# Patient Record
Sex: MALE | Race: WHITE | NOT HISPANIC OR LATINO | URBAN - METROPOLITAN AREA
[De-identification: names, ages, dates, MRNs, and addresses within clinical notes are randomized per-mention and may not be internally consistent; named-entity substitution may affect disease eponyms.]

---

## 2022-12-14 ENCOUNTER — INPATIENT (INPATIENT)
Facility: HOSPITAL | Age: 47
LOS: 5 days | Discharge: HOME | DRG: 920 | End: 2022-12-20
Attending: SURGERY | Admitting: SURGERY
Payer: MEDICARE

## 2022-12-14 VITALS
RESPIRATION RATE: 18 BRPM | WEIGHT: 205.91 LBS | DIASTOLIC BLOOD PRESSURE: 84 MMHG | HEIGHT: 70 IN | SYSTOLIC BLOOD PRESSURE: 125 MMHG | TEMPERATURE: 98 F | HEART RATE: 88 BPM | OXYGEN SATURATION: 98 %

## 2022-12-14 DIAGNOSIS — Z98.890 OTHER SPECIFIED POSTPROCEDURAL STATES: Chronic | ICD-10-CM

## 2022-12-14 DIAGNOSIS — L03.311 CELLULITIS OF ABDOMINAL WALL: ICD-10-CM

## 2022-12-14 DIAGNOSIS — K63.2 FISTULA OF INTESTINE: ICD-10-CM

## 2022-12-14 DIAGNOSIS — K43.9 VENTRAL HERNIA WITHOUT OBSTRUCTION OR GANGRENE: ICD-10-CM

## 2022-12-14 DIAGNOSIS — L98.499 NON-PRESSURE CHRONIC ULCER OF SKIN OF OTHER SITES WITH UNSPECIFIED SEVERITY: ICD-10-CM

## 2022-12-14 DIAGNOSIS — T81.83XA PERSISTENT POSTPROCEDURAL FISTULA, INITIAL ENCOUNTER: ICD-10-CM
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
## 2022-12-14 DIAGNOSIS — F17.200 NICOTINE DEPENDENCE, UNSPECIFIED, UNCOMPLICATED: ICD-10-CM

## 2022-12-14 DIAGNOSIS — Z53.09 PROCEDURE AND TREATMENT NOT CARRIED OUT BECAUSE OF OTHER CONTRAINDICATION: ICD-10-CM

## 2022-12-14 LAB
ALBUMIN SERPL ELPH-MCNC: 3.8 G/DL — SIGNIFICANT CHANGE UP (ref 3.3–5)
ALP SERPL-CCNC: 112 U/L — SIGNIFICANT CHANGE UP (ref 40–120)
ALT FLD-CCNC: 43 U/L — SIGNIFICANT CHANGE UP (ref 10–45)
ANION GAP SERPL CALC-SCNC: 10 MMOL/L — SIGNIFICANT CHANGE UP (ref 5–17)
APTT BLD: 30.9 SEC — SIGNIFICANT CHANGE UP (ref 27.5–35.5)
AST SERPL-CCNC: 50 U/L — HIGH (ref 10–40)
BASOPHILS # BLD AUTO: 0.05 K/UL — SIGNIFICANT CHANGE UP (ref 0–0.2)
BASOPHILS NFR BLD AUTO: 0.5 % — SIGNIFICANT CHANGE UP (ref 0–2)
BILIRUB SERPL-MCNC: 0.4 MG/DL — SIGNIFICANT CHANGE UP (ref 0.2–1.2)
BLD GP AB SCN SERPL QL: NEGATIVE — SIGNIFICANT CHANGE UP
BUN SERPL-MCNC: 12 MG/DL — SIGNIFICANT CHANGE UP (ref 7–23)
CALCIUM SERPL-MCNC: 9.4 MG/DL — SIGNIFICANT CHANGE UP (ref 8.4–10.5)
CHLORIDE SERPL-SCNC: 104 MMOL/L — SIGNIFICANT CHANGE UP (ref 96–108)
CO2 SERPL-SCNC: 23 MMOL/L — SIGNIFICANT CHANGE UP (ref 22–31)
CREAT SERPL-MCNC: 0.96 MG/DL — SIGNIFICANT CHANGE UP (ref 0.5–1.3)
EGFR: 97 ML/MIN/1.73M2 — SIGNIFICANT CHANGE UP
EOSINOPHIL # BLD AUTO: 0.22 K/UL — SIGNIFICANT CHANGE UP (ref 0–0.5)
EOSINOPHIL NFR BLD AUTO: 2 % — SIGNIFICANT CHANGE UP (ref 0–6)
GLUCOSE SERPL-MCNC: 106 MG/DL — HIGH (ref 70–99)
HCT VFR BLD CALC: 47.4 % — SIGNIFICANT CHANGE UP (ref 39–50)
HGB BLD-MCNC: 16.5 G/DL — SIGNIFICANT CHANGE UP (ref 13–17)
IMM GRANULOCYTES NFR BLD AUTO: 0.4 % — SIGNIFICANT CHANGE UP (ref 0–0.9)
INR BLD: 0.98 — SIGNIFICANT CHANGE UP (ref 0.88–1.16)
LYMPHOCYTES # BLD AUTO: 2.27 K/UL — SIGNIFICANT CHANGE UP (ref 1–3.3)
LYMPHOCYTES # BLD AUTO: 20.7 % — SIGNIFICANT CHANGE UP (ref 13–44)
MCHC RBC-ENTMCNC: 33.7 PG — SIGNIFICANT CHANGE UP (ref 27–34)
MCHC RBC-ENTMCNC: 34.8 GM/DL — SIGNIFICANT CHANGE UP (ref 32–36)
MCV RBC AUTO: 96.7 FL — SIGNIFICANT CHANGE UP (ref 80–100)
MONOCYTES # BLD AUTO: 0.55 K/UL — SIGNIFICANT CHANGE UP (ref 0–0.9)
MONOCYTES NFR BLD AUTO: 5 % — SIGNIFICANT CHANGE UP (ref 2–14)
NEUTROPHILS # BLD AUTO: 7.86 K/UL — HIGH (ref 1.8–7.4)
NEUTROPHILS NFR BLD AUTO: 71.4 % — SIGNIFICANT CHANGE UP (ref 43–77)
NRBC # BLD: 0 /100 WBCS — SIGNIFICANT CHANGE UP (ref 0–0)
PLATELET # BLD AUTO: 193 K/UL — SIGNIFICANT CHANGE UP (ref 150–400)
POTASSIUM SERPL-MCNC: SIGNIFICANT CHANGE UP MMOL/L (ref 3.5–5.3)
POTASSIUM SERPL-SCNC: SIGNIFICANT CHANGE UP MMOL/L (ref 3.5–5.3)
PROT SERPL-MCNC: 7.1 G/DL — SIGNIFICANT CHANGE UP (ref 6–8.3)
PROTHROM AB SERPL-ACNC: 11.6 SEC — SIGNIFICANT CHANGE UP (ref 10.5–13.4)
RBC # BLD: 4.9 M/UL — SIGNIFICANT CHANGE UP (ref 4.2–5.8)
RBC # FLD: 12.6 % — SIGNIFICANT CHANGE UP (ref 10.3–14.5)
RH IG SCN BLD-IMP: POSITIVE — SIGNIFICANT CHANGE UP
SARS-COV-2 RNA SPEC QL NAA+PROBE: SIGNIFICANT CHANGE UP
SODIUM SERPL-SCNC: 137 MMOL/L — SIGNIFICANT CHANGE UP (ref 135–145)
WBC # BLD: 10.99 K/UL — HIGH (ref 3.8–10.5)
WBC # FLD AUTO: 10.99 K/UL — HIGH (ref 3.8–10.5)

## 2022-12-14 PROCEDURE — 74177 CT ABD & PELVIS W/CONTRAST: CPT | Mod: 26,MA

## 2022-12-14 PROCEDURE — 99285 EMERGENCY DEPT VISIT HI MDM: CPT

## 2022-12-14 RX ORDER — ONDANSETRON 8 MG/1
4 TABLET, FILM COATED ORAL EVERY 6 HOURS
Refills: 0 | Status: DISCONTINUED | OUTPATIENT
Start: 2022-12-14 | End: 2022-12-20

## 2022-12-14 RX ORDER — SODIUM CHLORIDE 9 MG/ML
1000 INJECTION, SOLUTION INTRAVENOUS
Refills: 0 | Status: DISCONTINUED | OUTPATIENT
Start: 2022-12-14 | End: 2022-12-15

## 2022-12-14 RX ORDER — DIATRIZOATE MEGLUMINE 180 MG/ML
30 INJECTION, SOLUTION INTRAVESICAL ONCE
Refills: 0 | Status: COMPLETED | OUTPATIENT
Start: 2022-12-14 | End: 2022-12-14

## 2022-12-14 RX ORDER — AMPICILLIN SODIUM AND SULBACTAM SODIUM 250; 125 MG/ML; MG/ML
1.5 INJECTION, POWDER, FOR SUSPENSION INTRAMUSCULAR; INTRAVENOUS EVERY 6 HOURS
Refills: 0 | Status: DISCONTINUED | OUTPATIENT
Start: 2022-12-14 | End: 2022-12-20

## 2022-12-14 RX ORDER — HEPARIN SODIUM 5000 [USP'U]/ML
5000 INJECTION INTRAVENOUS; SUBCUTANEOUS EVERY 8 HOURS
Refills: 0 | Status: DISCONTINUED | OUTPATIENT
Start: 2022-12-14 | End: 2022-12-20

## 2022-12-14 RX ADMIN — SODIUM CHLORIDE 130 MILLILITER(S): 9 INJECTION, SOLUTION INTRAVENOUS at 19:52

## 2022-12-14 RX ADMIN — AMPICILLIN SODIUM AND SULBACTAM SODIUM 100 GRAM(S): 250; 125 INJECTION, POWDER, FOR SUSPENSION INTRAMUSCULAR; INTRAVENOUS at 18:34

## 2022-12-14 RX ADMIN — AMPICILLIN SODIUM AND SULBACTAM SODIUM 100 GRAM(S): 250; 125 INJECTION, POWDER, FOR SUSPENSION INTRAMUSCULAR; INTRAVENOUS at 23:46

## 2022-12-14 RX ADMIN — DIATRIZOATE MEGLUMINE 30 MILLILITER(S): 180 INJECTION, SOLUTION INTRAVESICAL at 13:35

## 2022-12-14 NOTE — H&P ADULT - ASSESSMENT
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 Costa for persistence drainage from abdominal wound. Pt afebrile, HDS. Abdomen with large midline wound with exposed erythematous subcutaneous tissue with drainage of yellow serous fluid, circumferential erythema/cellulitis around wound. Labs significant for WBC 10.99, Hb 16.5, unremarkable chemistry panel, and ALT 50. CTAP with PO/IV contrast demonstrates large midline abdominal wall muscular defect, likely from recent open abdominal surgery. Small bowel containing left anterolateral spigelian type hernia without evidence of strangulation.    Plan:  Admit to Regional, Team 5, Dr. Costa  NPO/IVF  Pain/nausea control PRN  Home meds as appropriate  HSQ/SCDs/OOB/IS  Wound Care Consult  AM labs

## 2022-12-14 NOTE — ED PROVIDER NOTE - PHYSICAL EXAMINATION
CONST: nontoxic NAD speaking in full sentences  HEAD: atraumatic  EYES: conjunctivae clear, PERRL, EOMI  ENT: mmm  NECK: supple/FROM, nttp, no jvd  CARD: rrr no murmurs  CHEST: ctab no r/r/w, no stridor/retractions/tripoding  ABD: soft, nd, nontender. Large rectangular shaped area with erythema and ulceration, noted some yellow-brown ?feculent material draining. Not purulent. Also noted old surgical scars well healed  EXT: FROM, symmetric distal pulses intact  SKIN: warm, dry, no rash, no pedal edema/ttp/rash, cap refill <2sec  NEURO: a+ox3, 5/5 strength x4, gross sensation intact x4

## 2022-12-14 NOTE — ED ADULT TRIAGE NOTE - CHIEF COMPLAINT QUOTE
Pt w/ hx of diverticulitis w/ rupture  presents to the ED c/o abdominal pain. Pt w/ hx of abdominal wall cellulitis, sent to be admitted to surgery. Denies fever, chills, NVD, CP, SOB.

## 2022-12-14 NOTE — H&P ADULT - NSHPLABSRESULTS_GEN_ALL_CORE
LABS:                        16.5   10.99 )-----------( 193      ( 14 Dec 2022 13:21 )             47.4     12-14    137  |  104  |  12  ----------------------------<  106<H>  see note   |  23  |  0.96    Ca    9.4      14 Dec 2022 13:21    TPro  7.1  /  Alb  3.8  /  TBili  0.4  /  DBili  x   /  AST  50<H>  /  ALT  43  /  AlkPhos  112  12-14    PT/INR - ( 14 Dec 2022 13:21 )   PT: 11.6 sec;   INR: 0.98         PTT - ( 14 Dec 2022 13:21 )  PTT:30.9 sec    ACC: [id number] EXAM:  CT ABDOMEN AND PELVIS OC IC                          PROCEDURE DATE:  12/14/2022          INTERPRETATION:  CLINICAL INFORMATION: Evaluate for diverticulitis, left   abdominal pain    COMPARISON: None.    CONTRAST/COMPLICATIONS:  IV Contrast: Isovue 370  90 cc administered   0 cc discarded.  Oral Contrast: Gastroview  Complications: None reported at time of study completion    PROCEDURE:  CT of the Abdomen and Pelvis was performed.  Sagittal and coronal reformats were performed.    FINDINGS:  LOWER CHEST: Bilateral dependent atelectasis. No cardiomegaly. No pleural   effusions. No pericardial effusion.    LIVER: Within normal limits.  BILE DUCTS: Normal caliber.  GALLBLADDER: Within normal limits.  SPLEEN: Within normal limits.  PANCREAS: Within normal limits.  ADRENALS: Within normal limits.  KIDNEYS/URETERS: Within normal limits.    BLADDER: Within normal limits.  REPRODUCTIVE ORGANS: Prostate within normal limits. Punctate   calcification within prostate.    BOWEL: Anastomotic sutures in the rectosigmoid colon. Minimal left   colonic diverticulosis without evidence of diverticulitis. No bowel   obstruction. Appendix is not visualized. No evidence of inflammation in   the pericecal region.  PERITONEUM: No ascites.  VESSELS: Within normal limits.  RETROPERITONEUM/LYMPH NODES: No lymphadenopathy.  ABDOMINAL WALL: Large midline abdominal wall muscular defect, likely from   recent open abdominal surgery. Small bowel containing left anterolateral   spigelian type hernia without evidence of strangulation. Small   fat-containing left inguinal hernia.  BONES: Within normal limits.    IMPRESSION:  No evidence of diverticulitis. Bowel containing left spigelian hernia   without strangulation. Other incidental comments as above.

## 2022-12-14 NOTE — ED ADULT NURSE NOTE - OBJECTIVE STATEMENT
Patient came to ED for admit after surgery site wound leaking.  Surgery site on abdominal is dressing by clean gauze with tape. no discharge noted at this time.  Denies fever, chills, N/V/D, CP, SOB, abdominal pain, abdominal discomfort, surgery site discomfort or any other discomfort at this time.  A&O4, alert and oriented, steady gait noted.

## 2022-12-14 NOTE — ED PROVIDER NOTE - CLINICAL SUMMARY MEDICAL DECISION MAKING FREE TEXT BOX
Concern for colocutaneous fistula based off appears feculent matter oozing from the ulcer, that would make sense why there is large non-healing ulcer for months. Also when drank PO contrast, pt says some was coming out of the wound  Wound does not look acutely infected, no purulent drainage, pt says has been this way for months. Will not give abx at this time.  plan for labs, CT, surgery consult

## 2022-12-14 NOTE — H&P ADULT - HISTORY OF PRESENT ILLNESS
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 Costa for persistence drainage from abdominal wound. Pt states that since his last surgery a year ago, he has had persistent leakage of yellow fluid through his midline wound which requires several dressing changes daily. States that he has otherwise been in his normal state of health with no active health issues. Denies abdominal pain, nausea, or vomiting. Has been tolerating a regular diet without weight loss. Reports continued flatus and states his last BM was earlier today and was normal and nonbloody.    Last colonoscopy 3 years ago prior to surgery with finding of diverticulosis  Denies family hx of IBS, Crohn's, UC, or colon cancer.    In the ED, pt afebrile, nontachycardic, normotensive, and satting on RA. Labs significant for WBC 10.99, Hb 16.5, unremarkable chemistry panel, and ALT 50. CTAP with PO/IV contrast demonstrates large midline abdominal wall muscular defect, likely from recent open abdominal surgery. Small bowel containing left anterolateral spigelian type hernia without evidence of strangulation.    PMH: diverticulosis  PSx: sigmoidectomy (3 years ago), 2 take back procedures with take down of colostomy  Social Hx: current smoker (35 years 1ppd), heavy EtOH (beer/whiskey), no illicits  Family Hx: unremarkable    Meds: None

## 2022-12-14 NOTE — ED PROVIDER NOTE - OBJECTIVE STATEMENT
47yM w/ hx perforated diverticulitis s/p sigmoidectomy with colostomy and reversal, complicated by EC fistula, sent in by Dr Costa for non-healing abdominal wound with persistent drainage. Pt says this has been going on for a year (since his last surgery) and persistently leaks yellow fluids and changes dressings multiple times a day. No fever chills nausea vomiting. Normal BMs and urination.

## 2022-12-14 NOTE — ED ADULT NURSE REASSESSMENT NOTE - NS ED NURSE REASSESS COMMENT FT1
Patient states "as soon as I drink this, it comes right out."  Patient states he is upset his dressing was removed and not given new dressings to cover the site, unsure who removed the dressings.
Patient reports abdominal surgery 12 months ago.  "It keeps leaking."  Dressing in place, mid abd noted to be distended with redness to skin, opening to mid wound redness/with brownish drainage.

## 2022-12-14 NOTE — H&P ADULT - NSHPPHYSICALEXAM_GEN_ALL_CORE
Vital Signs Last 24 Hrs  T(C): 36.5 (14 Dec 2022 12:11), Max: 36.5 (14 Dec 2022 12:11)  T(F): 97.7 (14 Dec 2022 12:11), Max: 97.7 (14 Dec 2022 12:11)  HR: 88 (14 Dec 2022 12:11) (88 - 88)  BP: 125/84 (14 Dec 2022 12:11) (125/84 - 125/84)  BP(mean): --  RR: 18 (14 Dec 2022 12:11) (18 - 18)  SpO2: 98% (14 Dec 2022 12:11) (98% - 98%)    Parameters below as of 14 Dec 2022 12:11  Patient On (Oxygen Delivery Method): room air    Physical Exam  General: AAOx3, NAD, laying comfortably in bed  Cardio: S1,S2, No MRG  Pulm: Nonlabored breathing  Abdomen: soft, obese, nontender, nondistended, large midline wound with exposed erythematous subcutaneous tissue with drainage of yellow serous fluid, circumferential erythema/cellulitis around wound  Extremities: WWP, peripheral pulses appreciated

## 2022-12-15 LAB
ANION GAP SERPL CALC-SCNC: 12 MMOL/L — SIGNIFICANT CHANGE UP (ref 5–17)
BLD GP AB SCN SERPL QL: NEGATIVE — SIGNIFICANT CHANGE UP
BUN SERPL-MCNC: 11 MG/DL — SIGNIFICANT CHANGE UP (ref 7–23)
CALCIUM SERPL-MCNC: 8.9 MG/DL — SIGNIFICANT CHANGE UP (ref 8.4–10.5)
CHLORIDE SERPL-SCNC: 101 MMOL/L — SIGNIFICANT CHANGE UP (ref 96–108)
CO2 SERPL-SCNC: 22 MMOL/L — SIGNIFICANT CHANGE UP (ref 22–31)
CREAT SERPL-MCNC: 0.95 MG/DL — SIGNIFICANT CHANGE UP (ref 0.5–1.3)
EGFR: 98 ML/MIN/1.73M2 — SIGNIFICANT CHANGE UP
GLUCOSE SERPL-MCNC: 97 MG/DL — SIGNIFICANT CHANGE UP (ref 70–99)
HCT VFR BLD CALC: 46.4 % — SIGNIFICANT CHANGE UP (ref 39–50)
HGB BLD-MCNC: 15.9 G/DL — SIGNIFICANT CHANGE UP (ref 13–17)
MAGNESIUM SERPL-MCNC: 1.8 MG/DL — SIGNIFICANT CHANGE UP (ref 1.6–2.6)
MCHC RBC-ENTMCNC: 32.9 PG — SIGNIFICANT CHANGE UP (ref 27–34)
MCHC RBC-ENTMCNC: 34.3 GM/DL — SIGNIFICANT CHANGE UP (ref 32–36)
MCV RBC AUTO: 95.9 FL — SIGNIFICANT CHANGE UP (ref 80–100)
NRBC # BLD: 0 /100 WBCS — SIGNIFICANT CHANGE UP (ref 0–0)
PHOSPHATE SERPL-MCNC: 2.7 MG/DL — SIGNIFICANT CHANGE UP (ref 2.5–4.5)
PLATELET # BLD AUTO: 146 K/UL — LOW (ref 150–400)
POTASSIUM SERPL-MCNC: 3.9 MMOL/L — SIGNIFICANT CHANGE UP (ref 3.5–5.3)
POTASSIUM SERPL-SCNC: 3.9 MMOL/L — SIGNIFICANT CHANGE UP (ref 3.5–5.3)
RBC # BLD: 4.84 M/UL — SIGNIFICANT CHANGE UP (ref 4.2–5.8)
RBC # FLD: 12.5 % — SIGNIFICANT CHANGE UP (ref 10.3–14.5)
RH IG SCN BLD-IMP: POSITIVE — SIGNIFICANT CHANGE UP
SODIUM SERPL-SCNC: 135 MMOL/L — SIGNIFICANT CHANGE UP (ref 135–145)
WBC # BLD: 7.73 K/UL — SIGNIFICANT CHANGE UP (ref 3.8–10.5)
WBC # FLD AUTO: 7.73 K/UL — SIGNIFICANT CHANGE UP (ref 3.8–10.5)

## 2022-12-15 RX ORDER — MAGNESIUM SULFATE 500 MG/ML
1 VIAL (ML) INJECTION ONCE
Refills: 0 | Status: COMPLETED | OUTPATIENT
Start: 2022-12-15 | End: 2022-12-15

## 2022-12-15 RX ORDER — NICOTINE POLACRILEX 2 MG
1 GUM BUCCAL DAILY
Refills: 0 | Status: DISCONTINUED | OUTPATIENT
Start: 2022-12-15 | End: 2022-12-20

## 2022-12-15 RX ORDER — POTASSIUM PHOSPHATE, MONOBASIC POTASSIUM PHOSPHATE, DIBASIC 236; 224 MG/ML; MG/ML
15 INJECTION, SOLUTION INTRAVENOUS ONCE
Refills: 0 | Status: COMPLETED | OUTPATIENT
Start: 2022-12-15 | End: 2022-12-15

## 2022-12-15 RX ADMIN — Medication 1 PATCH: at 18:32

## 2022-12-15 RX ADMIN — Medication 1 PATCH: at 13:08

## 2022-12-15 RX ADMIN — AMPICILLIN SODIUM AND SULBACTAM SODIUM 100 GRAM(S): 250; 125 INJECTION, POWDER, FOR SUSPENSION INTRAMUSCULAR; INTRAVENOUS at 18:01

## 2022-12-15 RX ADMIN — AMPICILLIN SODIUM AND SULBACTAM SODIUM 100 GRAM(S): 250; 125 INJECTION, POWDER, FOR SUSPENSION INTRAMUSCULAR; INTRAVENOUS at 06:27

## 2022-12-15 RX ADMIN — HEPARIN SODIUM 5000 UNIT(S): 5000 INJECTION INTRAVENOUS; SUBCUTANEOUS at 13:08

## 2022-12-15 RX ADMIN — Medication 100 GRAM(S): at 13:09

## 2022-12-15 RX ADMIN — HEPARIN SODIUM 5000 UNIT(S): 5000 INJECTION INTRAVENOUS; SUBCUTANEOUS at 06:32

## 2022-12-15 RX ADMIN — AMPICILLIN SODIUM AND SULBACTAM SODIUM 100 GRAM(S): 250; 125 INJECTION, POWDER, FOR SUSPENSION INTRAMUSCULAR; INTRAVENOUS at 13:08

## 2022-12-15 RX ADMIN — POTASSIUM PHOSPHATE, MONOBASIC POTASSIUM PHOSPHATE, DIBASIC 62.5 MILLIMOLE(S): 236; 224 INJECTION, SOLUTION INTRAVENOUS at 15:20

## 2022-12-15 RX ADMIN — Medication 600 MILLIGRAM(S): at 22:23

## 2022-12-15 RX ADMIN — HEPARIN SODIUM 5000 UNIT(S): 5000 INJECTION INTRAVENOUS; SUBCUTANEOUS at 22:23

## 2022-12-15 NOTE — PROGRESS NOTE ADULT - ASSESSMENT
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 Costa for persistence drainage from abdominal wound. Pt afebrile, HDS. Abdomen with large midline wound with exposed erythematous subcutaneous tissue with drainage of yellow serous fluid, circumferential erythema/cellulitis around wound. Labs significant for WBC 10.99, Hb 16.5, unremarkable chemistry panel, and ALT 50. CTAP with PO/IV contrast demonstrates large midline abdominal wall muscular defect, likely from recent open abdominal surgery. Small bowel containing left anterolateral spigelian type hernia without evidence of strangulation.    Plan:  Low fiber diet  Unaysn  Pain/nausea control PRN  Home meds as appropriate  HSQ/SCDs/OOB/IS  Wound Care Consult  AM labs

## 2022-12-15 NOTE — DISCHARGE NOTE PROVIDER - HOSPITAL COURSE
The patient is a 47 year old male with a PMHx of perforated diverticulitis s/p sigmoid resection with colostomy 3 years ago, c/b EC fistula and 2 subsequent take backs who now presents for persistent drainage from an abdominal wound. Patient has been afebrile and hemodynamically stable, labs significant for an elevated WBC at 10.99 on admission. His CTAP demonstrated a large midline abdominal wall defect, ECF, and small bowel with anterolateral spigelian type hernia without strangulation. Ostomy appliance placed at site of ECF, with no complications. His hospital course has been unremarkable with management of output. On day of discharge patient was stable to be d/c'd home.       The patient is a 47 year old male with a PMHx of perforated diverticulitis s/p sigmoid resection with colostomy 3 years ago, c/b EC fistula and 2 subsequent take backs who now presents for persistent drainage from an abdominal wound. Patient has been afebrile and hemodynamically stable, labs significant for an elevated WBC at 10.99 on admission. His CTAP demonstrated a large midline abdominal wall defect, ECF, and small bowel with anterolateral spigelian type hernia without strangulation. Wound manager appliance placed at site of ECF, with no complications. His hospital course has been unremarkable with management of output. On day of discharge patient was stable to be d/c'd home.       The patient is a 47 year old male with a PMHx of perforated diverticulitis s/p sigmoid resection with colostomy 3 years ago, c/b EC fistula and 2 subsequent take backs who now presents for persistent drainage from an abdominal wound. Patient has been afebrile and hemodynamically stable, labs significant for an elevated WBC at 10.99 on admission. His CTAP demonstrated a large midline abdominal wall defect, ECF, and small bowel with anterolateral spigelian type hernia without strangulation. Wound manager appliance placed at site of ECF, with no complications. His hospital course has been unremarkable with management of output. Patient states he is capable of managing pouch himself and declined VNS services. On day of discharge patient was stable to be d/c'd home.

## 2022-12-15 NOTE — DISCHARGE NOTE PROVIDER - INSTRUCTIONS
Please continue a LOW-FIBER DIET. Listed below are some foods you may eat and those you should avoid.   --Allowed foods:  White bread without nuts and seeds  White rice, plain white pasta, and crackers  Refined hot cereals, such as Cream of Wheat, or cold cereals with less than 1 gram of fiber per serving  Most canned or well-cooked vegetables and fruits without skins or seeds  Fruit and vegetable juice with little or no pulp, fruit-flavored drinks, and flavored roman  Tender meat, poultry, fish, eggs and tofu  Milk and foods made from milk — such as yogurt, pudding, ice cream, cheeses and sour cream — if tolerated  Butter, margarine, oils and salad dressings without seeds  --Foods to avoid:  Whole-wheat or whole-grain breads, cereals, pasta, legumes, seeds, nuts  Brown or wild rice and other whole grains  Dried fruits and prune juice  Raw or undercooked fruits and vegetables

## 2022-12-15 NOTE — DISCHARGE NOTE PROVIDER - NSDCFUADDINST_GEN_ALL_CORE_FT
Keep well hydrated. Replace fluid loss from ostomy daily. Avoid only drinking plain water. Include Gatorade and/or other vitamin drinks to replace fluid. Try to maintain ostomy output between 1000mL to 1500mL per day.     General Discharge Instructions:  Please resume all regular home medications unless specifically advised not to take a particular medication. Please take any new medications as prescribed.  Please get plenty of rest, continue to ambulate several times per day, and drink adequate amounts of fluids. Avoid lifting weights greater than 5-10 lbs until you follow-up with your surgeon, who will instruct you further regarding activity restrictions.  Avoid driving or operating heavy machinery while taking pain medications.  Please follow-up with your surgeon and Primary Care Provider (PCP) as advised.    Warning Signs:  Please call your doctor or nurse practitioner if you experience the following:  *You experience new chest pain, pressure, squeezing or tightness.  *New or worsening cough, shortness of breath, or wheeze.  *If you are vomiting and cannot keep down fluids or your medications.  *You are getting dehydrated due to continued vomiting, diarrhea, or other reasons. Signs of dehydration include dry mouth, rapid heartbeat, or feeling dizzy or faint when standing.  *You see blood or dark/black material when you vomit or have a bowel movement.  *You experience burning when you urinate, have blood in your urine, or experience a discharge.  *Your pain is not improving within 8-12 hours or is not gone within 24 hours. Call or return immediately if your pain is getting worse, changes location, or moves to your chest or back.  *You have shaking chills, or fever greater than 101.5 degrees Fahrenheit or 38 degrees Celsius.  *Any change in your symptoms, or any new symptoms that concern you.   Keep well hydrated. Replace fluid loss from wound manager daily. Avoid only drinking plain water. Include Gatorade and/or other vitamin drinks to replace fluid. Try to maintain ostomy output between 1000mL to 1500mL per day.     General Discharge Instructions:  Please resume all regular home medications unless specifically advised not to take a particular medication. Please take any new medications as prescribed.  Please get plenty of rest, continue to ambulate several times per day, and drink adequate amounts of fluids. Avoid lifting weights greater than 5-10 lbs until you follow-up with your surgeon, who will instruct you further regarding activity restrictions.  Avoid driving or operating heavy machinery while taking pain medications.  Please follow-up with your surgeon and Primary Care Provider (PCP) as advised.    Warning Signs:  Please call your doctor or nurse practitioner if you experience the following:  *You experience new chest pain, pressure, squeezing or tightness.  *New or worsening cough, shortness of breath, or wheeze.  *If you are vomiting and cannot keep down fluids or your medications.  *You are getting dehydrated due to continued vomiting, diarrhea, or other reasons. Signs of dehydration include dry mouth, rapid heartbeat, or feeling dizzy or faint when standing.  *You see blood or dark/black material when you vomit or have a bowel movement.  *You experience burning when you urinate, have blood in your urine, or experience a discharge.  *Your pain is not improving within 8-12 hours or is not gone within 24 hours. Call or return immediately if your pain is getting worse, changes location, or moves to your chest or back.  *You have shaking chills, or fever greater than 101.5 degrees Fahrenheit or 38 degrees Celsius.  *Any change in your symptoms, or any new symptoms that concern you.   Keep well hydrated. Replace fluid loss from wound manager daily. Avoid only drinking plain water. Include Gatorade and/or other vitamin drinks to replace fluid. Try to maintain ostomy output between 1000mL to 1500mL per day.     You may order ostomy supplies as needed through [employer] [phone] or [employer] [phone]. Paper prescriptions have been provided for insurance purposes.    General Discharge Instructions:  Please resume all regular home medications unless specifically advised not to take a particular medication. Please take any new medications as prescribed.  Please get plenty of rest, continue to ambulate several times per day, and drink adequate amounts of fluids. Avoid lifting weights greater than 5-10 lbs until you follow-up with your surgeon, who will instruct you further regarding activity restrictions.  Avoid driving or operating heavy machinery while taking pain medications.  Please follow-up with your surgeon and Primary Care Provider (PCP) as advised.    Warning Signs:  Please call your doctor or nurse practitioner if you experience the following:  *You experience new chest pain, pressure, squeezing or tightness.  *New or worsening cough, shortness of breath, or wheeze.  *If you are vomiting and cannot keep down fluids or your medications.  *You are getting dehydrated due to continued vomiting, diarrhea, or other reasons. Signs of dehydration include dry mouth, rapid heartbeat, or feeling dizzy or faint when standing.  *You see blood or dark/black material when you vomit or have a bowel movement.  *You experience burning when you urinate, have blood in your urine, or experience a discharge.  *Your pain is not improving within 8-12 hours or is not gone within 24 hours. Call or return immediately if your pain is getting worse, changes location, or moves to your chest or back.  *You have shaking chills, or fever greater than 101.5 degrees Fahrenheit or 38 degrees Celsius.  *Any change in your symptoms, or any new symptoms that concern you.

## 2022-12-15 NOTE — DISCHARGE NOTE PROVIDER - CARE PROVIDER_API CALL
Iris Costa (DO)  Surgery; Surgical Critical Care  [address] [place] [address]  Phone: [phone]  Fax: [phone]  Follow Up Time:

## 2022-12-15 NOTE — PROGRESS NOTE ADULT - SUBJECTIVE AND OBJECTIVE BOX
SUBJECTIVE: Pt seen and examined at bedside this am by surgery team. Patient is lying comfortably in bed with no complaints. Tolerating diet, pain well controlled with current regimen. Patient denies fever, nausea, vomiting, chest pain, and shortness of breath. Patient is passing gas and having bowel movements. Patient is eager to leave.     MEDICATIONS  (STANDING):  ampicillin/sulbactam  IVPB 1.5 Gram(s) IV Intermittent every 6 hours  heparin   Injectable 5000 Unit(s) SubCutaneous every 8 hours  lactated ringers. 1000 milliLiter(s) (130 mL/Hr) IV Continuous <Continuous>    MEDICATIONS  (PRN):  ondansetron Injectable 4 milliGRAM(s) IV Push every 6 hours PRN Nausea      Vital Signs Last 24 Hrs  T(C): 37.3 (15 Dec 2022 05:00), Max: 37.3 (14 Dec 2022 17:18)  T(F): 99.2 (15 Dec 2022 05:00), Max: 99.2 (15 Dec 2022 05:00)  HR: 74 (15 Dec 2022 05:00) (67 - 88)  BP: 133/77 (15 Dec 2022 05:00) (98/61 - 133/77)  BP(mean): 96 (15 Dec 2022 05:00) (96 - 96)  RR: 18 (15 Dec 2022 05:00) (18 - 19)  SpO2: 96% (15 Dec 2022 05:00) (96% - 99%)    Parameters below as of 15 Dec 2022 05:00  Patient On (Oxygen Delivery Method): room air        Physical Exam  General: Patient is doing well and lying in bed comfortably  Constitutional: alert and oriented   Pulm: Nonlabored breathing, no respiratory distress  CV: Regular rate and rhythm, normal sinus rhythm  Abd:  soft, nontender, nondistended. No rebound, no guarding. Fistula covered with gauze/abd pads and draining.   Extremities: warm, well perfused, no edema    I&O's Detail    14 Dec 2022 07:01  -  15 Dec 2022 07:00  --------------------------------------------------------  IN:  Total IN: 0 mL    OUT:    Voided (mL): 700 mL  Total OUT: 700 mL    Total NET: -700 mL        LABS:                        16.5   10.99 )-----------( 193      ( 14 Dec 2022 13:21 )             47.4     12-14    137  |  104  |  12  ----------------------------<  106<H>  see note   |  23  |  0.96    Ca    9.4      14 Dec 2022 13:21    TPro  7.1  /  Alb  3.8  /  TBili  0.4  /  DBili  x   /  AST  50<H>  /  ALT  43  /  AlkPhos  112  12-14    PT/INR - ( 14 Dec 2022 13:21 )   PT: 11.6 sec;   INR: 0.98          PTT - ( 14 Dec 2022 13:21 )  PTT:30.9 sec

## 2022-12-15 NOTE — DISCHARGE NOTE PROVIDER - NSDCCPCAREPLAN_GEN_ALL_CORE_FT
PRINCIPAL DISCHARGE DIAGNOSIS  Diagnosis: Colocutaneous fistula  Assessment and Plan of Treatment:       SECONDARY DISCHARGE DIAGNOSES  Diagnosis: Abdominal wall skin ulcer  Assessment and Plan of Treatment:

## 2022-12-15 NOTE — DISCHARGE NOTE PROVIDER - NSDCMRMEDTOKEN_GEN_ALL_CORE_FT
Adapt Barrier Rings 2&quot; #8805: Apply topically to affected area once a day   Adapt Convex Rings #47765: Apply topically to affected area once a day   Adapt Stoma Powder #7906: Apply topically to affected area once a day   Coloplast Brava Protective Sheet #542485: Apply topically to affected area once a day   Convatec Aquacel Extra 4&quot;x5&quot;: 1 application transdermal once a day   Eakins Wound Pouch #[id number]: Apply topically to affected area once a day   [place] postop pouch #98986: Apply topically to affected area once a day   Skin Barrier Paste #75620: Apply topically to affected area once a day   Skin Prep #7917: Apply topically to affected area once a day

## 2022-12-16 LAB
ANION GAP SERPL CALC-SCNC: 10 MMOL/L — SIGNIFICANT CHANGE UP (ref 5–17)
BUN SERPL-MCNC: 14 MG/DL — SIGNIFICANT CHANGE UP (ref 7–23)
CALCIUM SERPL-MCNC: 9.1 MG/DL — SIGNIFICANT CHANGE UP (ref 8.4–10.5)
CHLORIDE SERPL-SCNC: 98 MMOL/L — SIGNIFICANT CHANGE UP (ref 96–108)
CO2 SERPL-SCNC: 26 MMOL/L — SIGNIFICANT CHANGE UP (ref 22–31)
CREAT SERPL-MCNC: 1.29 MG/DL — SIGNIFICANT CHANGE UP (ref 0.5–1.3)
EGFR: 68 ML/MIN/1.73M2 — SIGNIFICANT CHANGE UP
GLUCOSE SERPL-MCNC: 98 MG/DL — SIGNIFICANT CHANGE UP (ref 70–99)
HCT VFR BLD CALC: 47.1 % — SIGNIFICANT CHANGE UP (ref 39–50)
HGB BLD-MCNC: 16.2 G/DL — SIGNIFICANT CHANGE UP (ref 13–17)
MAGNESIUM SERPL-MCNC: 2.3 MG/DL — SIGNIFICANT CHANGE UP (ref 1.6–2.6)
MCHC RBC-ENTMCNC: 33.6 PG — SIGNIFICANT CHANGE UP (ref 27–34)
MCHC RBC-ENTMCNC: 34.4 GM/DL — SIGNIFICANT CHANGE UP (ref 32–36)
MCV RBC AUTO: 97.7 FL — SIGNIFICANT CHANGE UP (ref 80–100)
NRBC # BLD: 0 /100 WBCS — SIGNIFICANT CHANGE UP (ref 0–0)
PHOSPHATE SERPL-MCNC: 3.4 MG/DL — SIGNIFICANT CHANGE UP (ref 2.5–4.5)
PLATELET # BLD AUTO: 139 K/UL — LOW (ref 150–400)
POTASSIUM SERPL-MCNC: 4.1 MMOL/L — SIGNIFICANT CHANGE UP (ref 3.5–5.3)
POTASSIUM SERPL-SCNC: 4.1 MMOL/L — SIGNIFICANT CHANGE UP (ref 3.5–5.3)
RBC # BLD: 4.82 M/UL — SIGNIFICANT CHANGE UP (ref 4.2–5.8)
RBC # FLD: 12.6 % — SIGNIFICANT CHANGE UP (ref 10.3–14.5)
SODIUM SERPL-SCNC: 134 MMOL/L — LOW (ref 135–145)
WBC # BLD: 7.18 K/UL — SIGNIFICANT CHANGE UP (ref 3.8–10.5)
WBC # FLD AUTO: 7.18 K/UL — SIGNIFICANT CHANGE UP (ref 3.8–10.5)

## 2022-12-16 RX ORDER — HYDROMORPHONE HYDROCHLORIDE 2 MG/ML
0.5 INJECTION INTRAMUSCULAR; INTRAVENOUS; SUBCUTANEOUS ONCE
Refills: 0 | Status: DISCONTINUED | OUTPATIENT
Start: 2022-12-16 | End: 2022-12-16

## 2022-12-16 RX ADMIN — HEPARIN SODIUM 5000 UNIT(S): 5000 INJECTION INTRAVENOUS; SUBCUTANEOUS at 14:12

## 2022-12-16 RX ADMIN — HYDROMORPHONE HYDROCHLORIDE 0.5 MILLIGRAM(S): 2 INJECTION INTRAMUSCULAR; INTRAVENOUS; SUBCUTANEOUS at 09:57

## 2022-12-16 RX ADMIN — AMPICILLIN SODIUM AND SULBACTAM SODIUM 100 GRAM(S): 250; 125 INJECTION, POWDER, FOR SUSPENSION INTRAMUSCULAR; INTRAVENOUS at 07:03

## 2022-12-16 RX ADMIN — AMPICILLIN SODIUM AND SULBACTAM SODIUM 100 GRAM(S): 250; 125 INJECTION, POWDER, FOR SUSPENSION INTRAMUSCULAR; INTRAVENOUS at 12:47

## 2022-12-16 RX ADMIN — Medication 600 MILLIGRAM(S): at 22:39

## 2022-12-16 RX ADMIN — HEPARIN SODIUM 5000 UNIT(S): 5000 INJECTION INTRAVENOUS; SUBCUTANEOUS at 07:03

## 2022-12-16 RX ADMIN — AMPICILLIN SODIUM AND SULBACTAM SODIUM 100 GRAM(S): 250; 125 INJECTION, POWDER, FOR SUSPENSION INTRAMUSCULAR; INTRAVENOUS at 18:20

## 2022-12-16 RX ADMIN — HYDROMORPHONE HYDROCHLORIDE 0.5 MILLIGRAM(S): 2 INJECTION INTRAMUSCULAR; INTRAVENOUS; SUBCUTANEOUS at 10:16

## 2022-12-16 RX ADMIN — AMPICILLIN SODIUM AND SULBACTAM SODIUM 100 GRAM(S): 250; 125 INJECTION, POWDER, FOR SUSPENSION INTRAMUSCULAR; INTRAVENOUS at 00:06

## 2022-12-16 RX ADMIN — HEPARIN SODIUM 5000 UNIT(S): 5000 INJECTION INTRAVENOUS; SUBCUTANEOUS at 22:39

## 2022-12-16 NOTE — PROGRESS NOTE ADULT - SUBJECTIVE AND OBJECTIVE BOX
SUBJECTIVE: Pt seen and examined at bedside this am by surgery team. Patient is lying comfortably in bed with no complaints. Tolerating diet, pain well controlled with current regimen. Patient denies fever, nausea, vomiting, chest pain, and shortness of breath. Patient is passing gas and having bowel movements. Wound care appliance leaked overnight.     MEDICATIONS  (STANDING):  ampicillin/sulbactam  IVPB 1.5 Gram(s) IV Intermittent every 6 hours  heparin   Injectable 5000 Unit(s) SubCutaneous every 8 hours  nicotine -  14 mG/24Hr(s) Patch 1 Patch Transdermal daily    MEDICATIONS  (PRN):  guaiFENesin  milliGRAM(s) Oral every 12 hours PRN Cough  ondansetron Injectable 4 milliGRAM(s) IV Push every 6 hours PRN Nausea      Vital Signs Last 24 Hrs  T(C): 37 (16 Dec 2022 05:20), Max: 37.7 (15 Dec 2022 13:35)  T(F): 98.6 (16 Dec 2022 05:20), Max: 99.8 (15 Dec 2022 13:35)  HR: 70 (16 Dec 2022 05:20) (70 - 86)  BP: 102/68 (16 Dec 2022 05:20) (102/68 - 130/76)  BP(mean): 90 (15 Dec 2022 13:35) (90 - 90)  RR: 18 (16 Dec 2022 05:20) (17 - 18)  SpO2: 95% (16 Dec 2022 05:20) (94% - 98%)    Parameters below as of 16 Dec 2022 05:20  Patient On (Oxygen Delivery Method): room air        Physical Exam  General: Patient is doing well and lying in bed comfortably  Constitutional: alert and oriented   Pulm: Nonlabored breathing, no respiratory distress  CV: Regular rate and rhythm, normal sinus rhythm  Abd:  soft, nontender, nondistended. No rebound, no guarding. Wound care appliance in place with some leaking.  Extremities: warm, well perfused, no edema    I&O's Detail    15 Dec 2022 07:01  -  16 Dec 2022 07:00  --------------------------------------------------------  IN:    Oral Fluid: 480 mL  Total IN: 480 mL    OUT:    Drain (mL): 20 mL    Voided (mL): 725 mL  Total OUT: 745 mL    Total NET: -265 mL        LABS:                        16.2   7.18  )-----------( 139      ( 16 Dec 2022 05:49 )             47.1     12-16    134<L>  |  98  |  14  ----------------------------<  98  4.1   |  26  |  1.29    Ca    9.1      16 Dec 2022 05:50  Phos  3.4     12-16  Mg     2.3     12-16    TPro  7.1  /  Alb  3.8  /  TBili  0.4  /  DBili  x   /  AST  50<H>  /  ALT  43  /  AlkPhos  112  12-14    PT/INR - ( 14 Dec 2022 13:21 )   PT: 11.6 sec;   INR: 0.98          PTT - ( 14 Dec 2022 13:21 )  PTT:30.9 sec

## 2022-12-16 NOTE — PROGRESS NOTE ADULT - ASSESSMENT
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 Costa for persistence drainage from abdominal wound. Pt afebrile, HDS. Abdomen with large midline wound with exposed erythematous subcutaneous tissue with drainage of yellow serous fluid, circumferential erythema/cellulitis around wound. Labs significant for WBC 10.99, Hb 16.5, unremarkable chemistry panel, and ALT 50. CTAP with PO/IV contrast demonstrates large midline abdominal wall muscular defect, likely from recent open abdominal surgery. Small bowel containing left anterolateral spigelian type hernia without evidence of strangulation.    Plan:  LFD  Pain/nausea control prn  Unasyn  Home meds as appropriate  Wound Care  HSQ/SCDs/OOB/IS  AM labs

## 2022-12-17 LAB
ANION GAP SERPL CALC-SCNC: 11 MMOL/L — SIGNIFICANT CHANGE UP (ref 5–17)
ANION GAP SERPL CALC-SCNC: 12 MMOL/L — SIGNIFICANT CHANGE UP (ref 5–17)
BUN SERPL-MCNC: 15 MG/DL — SIGNIFICANT CHANGE UP (ref 7–23)
BUN SERPL-MCNC: 17 MG/DL — SIGNIFICANT CHANGE UP (ref 7–23)
CALCIUM SERPL-MCNC: 8.9 MG/DL — SIGNIFICANT CHANGE UP (ref 8.4–10.5)
CALCIUM SERPL-MCNC: 9.5 MG/DL — SIGNIFICANT CHANGE UP (ref 8.4–10.5)
CHLORIDE SERPL-SCNC: 103 MMOL/L — SIGNIFICANT CHANGE UP (ref 96–108)
CHLORIDE SERPL-SCNC: 98 MMOL/L — SIGNIFICANT CHANGE UP (ref 96–108)
CO2 SERPL-SCNC: 23 MMOL/L — SIGNIFICANT CHANGE UP (ref 22–31)
CO2 SERPL-SCNC: 25 MMOL/L — SIGNIFICANT CHANGE UP (ref 22–31)
CREAT SERPL-MCNC: 0.97 MG/DL — SIGNIFICANT CHANGE UP (ref 0.5–1.3)
CREAT SERPL-MCNC: 1.21 MG/DL — SIGNIFICANT CHANGE UP (ref 0.5–1.3)
EGFR: 73 ML/MIN/1.73M2 — SIGNIFICANT CHANGE UP
EGFR: 96 ML/MIN/1.73M2 — SIGNIFICANT CHANGE UP
GLUCOSE SERPL-MCNC: 101 MG/DL — HIGH (ref 70–99)
GLUCOSE SERPL-MCNC: 109 MG/DL — HIGH (ref 70–99)
HCT VFR BLD CALC: 47.3 % — SIGNIFICANT CHANGE UP (ref 39–50)
HCT VFR BLD CALC: 47.7 % — SIGNIFICANT CHANGE UP (ref 39–50)
HGB BLD-MCNC: 16 G/DL — SIGNIFICANT CHANGE UP (ref 13–17)
HGB BLD-MCNC: 16.2 G/DL — SIGNIFICANT CHANGE UP (ref 13–17)
MAGNESIUM SERPL-MCNC: 2.1 MG/DL — SIGNIFICANT CHANGE UP (ref 1.6–2.6)
MAGNESIUM SERPL-MCNC: 2.2 MG/DL — SIGNIFICANT CHANGE UP (ref 1.6–2.6)
MCHC RBC-ENTMCNC: 32.7 PG — SIGNIFICANT CHANGE UP (ref 27–34)
MCHC RBC-ENTMCNC: 32.7 PG — SIGNIFICANT CHANGE UP (ref 27–34)
MCHC RBC-ENTMCNC: 33.8 GM/DL — SIGNIFICANT CHANGE UP (ref 32–36)
MCHC RBC-ENTMCNC: 34 GM/DL — SIGNIFICANT CHANGE UP (ref 32–36)
MCV RBC AUTO: 96.4 FL — SIGNIFICANT CHANGE UP (ref 80–100)
MCV RBC AUTO: 96.7 FL — SIGNIFICANT CHANGE UP (ref 80–100)
NRBC # BLD: 0 /100 WBCS — SIGNIFICANT CHANGE UP (ref 0–0)
NRBC # BLD: 0 /100 WBCS — SIGNIFICANT CHANGE UP (ref 0–0)
PHOSPHATE SERPL-MCNC: 3.6 MG/DL — SIGNIFICANT CHANGE UP (ref 2.5–4.5)
PHOSPHATE SERPL-MCNC: 4.4 MG/DL — SIGNIFICANT CHANGE UP (ref 2.5–4.5)
PLATELET # BLD AUTO: 137 K/UL — LOW (ref 150–400)
PLATELET # BLD AUTO: 145 K/UL — LOW (ref 150–400)
POTASSIUM SERPL-MCNC: 4 MMOL/L — SIGNIFICANT CHANGE UP (ref 3.5–5.3)
POTASSIUM SERPL-MCNC: 4.3 MMOL/L — SIGNIFICANT CHANGE UP (ref 3.5–5.3)
POTASSIUM SERPL-SCNC: 4 MMOL/L — SIGNIFICANT CHANGE UP (ref 3.5–5.3)
POTASSIUM SERPL-SCNC: 4.3 MMOL/L — SIGNIFICANT CHANGE UP (ref 3.5–5.3)
RBC # BLD: 4.89 M/UL — SIGNIFICANT CHANGE UP (ref 4.2–5.8)
RBC # BLD: 4.95 M/UL — SIGNIFICANT CHANGE UP (ref 4.2–5.8)
RBC # FLD: 12.4 % — SIGNIFICANT CHANGE UP (ref 10.3–14.5)
RBC # FLD: 12.7 % — SIGNIFICANT CHANGE UP (ref 10.3–14.5)
SODIUM SERPL-SCNC: 135 MMOL/L — SIGNIFICANT CHANGE UP (ref 135–145)
SODIUM SERPL-SCNC: 137 MMOL/L — SIGNIFICANT CHANGE UP (ref 135–145)
WBC # BLD: 5.45 K/UL — SIGNIFICANT CHANGE UP (ref 3.8–10.5)
WBC # BLD: 5.78 K/UL — SIGNIFICANT CHANGE UP (ref 3.8–10.5)
WBC # FLD AUTO: 5.45 K/UL — SIGNIFICANT CHANGE UP (ref 3.8–10.5)
WBC # FLD AUTO: 5.78 K/UL — SIGNIFICANT CHANGE UP (ref 3.8–10.5)

## 2022-12-17 PROCEDURE — 71045 X-RAY EXAM CHEST 1 VIEW: CPT | Mod: 26

## 2022-12-17 RX ADMIN — HEPARIN SODIUM 5000 UNIT(S): 5000 INJECTION INTRAVENOUS; SUBCUTANEOUS at 07:14

## 2022-12-17 RX ADMIN — AMPICILLIN SODIUM AND SULBACTAM SODIUM 100 GRAM(S): 250; 125 INJECTION, POWDER, FOR SUSPENSION INTRAMUSCULAR; INTRAVENOUS at 18:36

## 2022-12-17 RX ADMIN — AMPICILLIN SODIUM AND SULBACTAM SODIUM 100 GRAM(S): 250; 125 INJECTION, POWDER, FOR SUSPENSION INTRAMUSCULAR; INTRAVENOUS at 00:06

## 2022-12-17 RX ADMIN — AMPICILLIN SODIUM AND SULBACTAM SODIUM 100 GRAM(S): 250; 125 INJECTION, POWDER, FOR SUSPENSION INTRAMUSCULAR; INTRAVENOUS at 12:21

## 2022-12-17 RX ADMIN — HEPARIN SODIUM 5000 UNIT(S): 5000 INJECTION INTRAVENOUS; SUBCUTANEOUS at 14:01

## 2022-12-17 RX ADMIN — AMPICILLIN SODIUM AND SULBACTAM SODIUM 100 GRAM(S): 250; 125 INJECTION, POWDER, FOR SUSPENSION INTRAMUSCULAR; INTRAVENOUS at 07:14

## 2022-12-17 NOTE — PROGRESS NOTE ADULT - SUBJECTIVE AND OBJECTIVE BOX
SUBJECTIVE: Pt seen and examined at bedside this am by surgery team. Patient is lying comfortably in bed with no complaints. Tolerating diet, pain well controlled with current regimen. Patient denies fever, nausea, vomiting, chest pain, and shortness of breath. Patient is passing gas and having bowel movements. Wound care appliance in place with no leaks.    MEDICATIONS  (STANDING):  ampicillin/sulbactam  IVPB 1.5 Gram(s) IV Intermittent every 6 hours  heparin   Injectable 5000 Unit(s) SubCutaneous every 8 hours  nicotine -  14 mG/24Hr(s) Patch 1 Patch Transdermal daily    MEDICATIONS  (PRN):  guaiFENesin  milliGRAM(s) Oral every 12 hours PRN Cough  ondansetron Injectable 4 milliGRAM(s) IV Push every 6 hours PRN Nausea      Vital Signs Last 24 Hrs  T(C): 37.3 (17 Dec 2022 04:58), Max: 37.3 (17 Dec 2022 04:58)  T(F): 99.2 (17 Dec 2022 04:58), Max: 99.2 (17 Dec 2022 04:58)  HR: 73 (17 Dec 2022 04:58) (67 - 73)  BP: 104/66 (17 Dec 2022 04:58) (104/66 - 147/78)  BP(mean): --  RR: 17 (17 Dec 2022 04:58) (17 - 18)  SpO2: 94% (17 Dec 2022 04:58) (94% - 96%)    Parameters below as of 17 Dec 2022 04:58  Patient On (Oxygen Delivery Method): room air        Physical Exam  General: Patient is doing well and lying in bed comfortably  Constitutional: alert and oriented   Pulm: Nonlabored breathing, no respiratory distress  CV: Regular rate and rhythm, normal sinus rhythm  Abd:  soft, nontender, nondistended. No rebound, no guarding.   Extremities: warm, well perfused, no edema  Drains: Wound vac appliance in place; wound vac with appropriate suction; ostomy appliance with no leaks.     I&O's Detail    16 Dec 2022 07:01  -  17 Dec 2022 07:00  --------------------------------------------------------  IN:    Oral Fluid: 550 mL  Total IN: 550 mL    OUT:    Drain (mL): 60 mL    VAC (Vacuum Assisted Closure) System (mL): 100 mL    Voided (mL): 450 mL  Total OUT: 610 mL    Total NET: -60 mL        LABS:                        16.0   5.45  )-----------( 137      ( 17 Dec 2022 07:44 )             47.3     12-17    137  |  103  |  15  ----------------------------<  109<H>  4.0   |  23  |  0.97    Ca    8.9      17 Dec 2022 07:44  Phos  3.6     12-17  Mg     2.2     12-17

## 2022-12-17 NOTE — PROGRESS NOTE ADULT - ASSESSMENT
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 Costa for persistence drainage from abdominal wound. Pt afebrile, HDS. Abdomen with large midline wound with exposed erythematous subcutaneous tissue with drainage of yellow serous fluid, circumferential erythema/cellulitis around wound. Labs significant for WBC 10.99, Hb 16.5, unremarkable chemistry panel, and ALT 50. CTAP with PO/IV contrast demonstrates large midline abdominal wall muscular defect, likely from recent open abdominal surgery. Small bowel containing left anterolateral spigelian type hernia without evidence of strangulation.    Plan:  LFD  Pain/nausea control prn  Unasyn  Home meds as appropriate  HSQ/SCDs/OOB/IS  AM labs

## 2022-12-18 LAB
ANION GAP SERPL CALC-SCNC: 13 MMOL/L — SIGNIFICANT CHANGE UP (ref 5–17)
BUN SERPL-MCNC: 16 MG/DL — SIGNIFICANT CHANGE UP (ref 7–23)
CALCIUM SERPL-MCNC: 9.6 MG/DL — SIGNIFICANT CHANGE UP (ref 8.4–10.5)
CHLORIDE SERPL-SCNC: 102 MMOL/L — SIGNIFICANT CHANGE UP (ref 96–108)
CO2 SERPL-SCNC: 22 MMOL/L — SIGNIFICANT CHANGE UP (ref 22–31)
CREAT SERPL-MCNC: 1.05 MG/DL — SIGNIFICANT CHANGE UP (ref 0.5–1.3)
EGFR: 87 ML/MIN/1.73M2 — SIGNIFICANT CHANGE UP
GLUCOSE SERPL-MCNC: 102 MG/DL — HIGH (ref 70–99)
HCT VFR BLD CALC: 47.1 % — SIGNIFICANT CHANGE UP (ref 39–50)
HGB BLD-MCNC: 16.3 G/DL — SIGNIFICANT CHANGE UP (ref 13–17)
MAGNESIUM SERPL-MCNC: 2.1 MG/DL — SIGNIFICANT CHANGE UP (ref 1.6–2.6)
MCHC RBC-ENTMCNC: 33.3 PG — SIGNIFICANT CHANGE UP (ref 27–34)
MCHC RBC-ENTMCNC: 34.6 GM/DL — SIGNIFICANT CHANGE UP (ref 32–36)
MCV RBC AUTO: 96.1 FL — SIGNIFICANT CHANGE UP (ref 80–100)
NRBC # BLD: 0 /100 WBCS — SIGNIFICANT CHANGE UP (ref 0–0)
PHOSPHATE SERPL-MCNC: 3.8 MG/DL — SIGNIFICANT CHANGE UP (ref 2.5–4.5)
PLATELET # BLD AUTO: 140 K/UL — LOW (ref 150–400)
POTASSIUM SERPL-MCNC: 4.2 MMOL/L — SIGNIFICANT CHANGE UP (ref 3.5–5.3)
POTASSIUM SERPL-SCNC: 4.2 MMOL/L — SIGNIFICANT CHANGE UP (ref 3.5–5.3)
RBC # BLD: 4.9 M/UL — SIGNIFICANT CHANGE UP (ref 4.2–5.8)
RBC # FLD: 12.5 % — SIGNIFICANT CHANGE UP (ref 10.3–14.5)
SODIUM SERPL-SCNC: 137 MMOL/L — SIGNIFICANT CHANGE UP (ref 135–145)
WBC # BLD: 5.33 K/UL — SIGNIFICANT CHANGE UP (ref 3.8–10.5)
WBC # FLD AUTO: 5.33 K/UL — SIGNIFICANT CHANGE UP (ref 3.8–10.5)

## 2022-12-18 RX ADMIN — AMPICILLIN SODIUM AND SULBACTAM SODIUM 100 GRAM(S): 250; 125 INJECTION, POWDER, FOR SUSPENSION INTRAMUSCULAR; INTRAVENOUS at 17:12

## 2022-12-18 RX ADMIN — HEPARIN SODIUM 5000 UNIT(S): 5000 INJECTION INTRAVENOUS; SUBCUTANEOUS at 05:39

## 2022-12-18 RX ADMIN — AMPICILLIN SODIUM AND SULBACTAM SODIUM 100 GRAM(S): 250; 125 INJECTION, POWDER, FOR SUSPENSION INTRAMUSCULAR; INTRAVENOUS at 05:39

## 2022-12-18 RX ADMIN — HEPARIN SODIUM 5000 UNIT(S): 5000 INJECTION INTRAVENOUS; SUBCUTANEOUS at 00:15

## 2022-12-18 RX ADMIN — AMPICILLIN SODIUM AND SULBACTAM SODIUM 100 GRAM(S): 250; 125 INJECTION, POWDER, FOR SUSPENSION INTRAMUSCULAR; INTRAVENOUS at 11:08

## 2022-12-18 RX ADMIN — AMPICILLIN SODIUM AND SULBACTAM SODIUM 100 GRAM(S): 250; 125 INJECTION, POWDER, FOR SUSPENSION INTRAMUSCULAR; INTRAVENOUS at 00:15

## 2022-12-18 NOTE — PROGRESS NOTE ADULT - SUBJECTIVE AND OBJECTIVE BOX
pt with clinical ECF.  pouching/vac not working and still leaking.  Patient says output is approx 200cc a day.  has had this condition for a year.  CT scan done here does not mention fistula or extravasation of contrast.    Abd soft, enteric contents coming out of old wound.    A/P Pt with ECF  -at least 1ppd smoker.  not smoking for last week.   to optimize for potentially elective high risk surgery i would want him to cessate smoking for at least 6 weeks.  -his nutritional status seems okay as his weight stable for past year and albumin normal.  -no sepsis  -cont abx for skin cellulitis from leakage.  -will need to pouch better to protect skin.  if not may need to NPO/TPN or upgrade surgical timetable.  -will need to review his old operative notes as he says he has mesh  -long discussion had with patient including high risk nature of case including risk of long operation with leak/recurrent ECF or worse.  if we do operate will likely need mesh explanation, ext MARIA DEL CARMEN and likely bowel resection.  abdominal wall reconstruction will be likely and given how far apart his rectus muscle is I suspect we will only be able to do a biologic bridge and have a large hernia going forward but this would be preferable to ECF.

## 2022-12-18 NOTE — PROGRESS NOTE ADULT - ASSESSMENT
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 Costa for persistence drainage from abdominal wound. Pt afebrile, HDS. Abdomen with large midline wound with exposed erythematous subcutaneous tissue with drainage of yellow serous fluid, circumferential erythema/cellulitis around wound. Labs significant for WBC 10.99, Hb 16.5, unremarkable chemistry panel, and ALT 50. CTAP with PO/IV contrast demonstrates large midline abdominal wall muscular defect, likely from recent open abdominal surgery. Small bowel containing left anterolateral spigelian type hernia without evidence of strangulation.    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 Costa for persistence drainage from abdominal wound. Pt afebrile, HDS. Abdomen with large midline wound with exposed erythematous subcutaneous tissue with drainage of yellow serous fluid, circumferential erythema/cellulitis around wound. Labs significant for WBC 10.99, Hb 16.5, unremarkable chemistry panel, and ALT 50. CTAP with PO/IV contrast demonstrates large midline abdominal wall muscular defect, likely from recent open abdominal surgery. Small bowel containing left anterolateral spigelian type hernia without evidence of strangulation.    Plan:  LRD  Pain/nausea control prn  c/w Unasyn  Home meds as appropriate  HSQ/SCDs/OOB/IS  AM labs

## 2022-12-18 NOTE — PROGRESS NOTE ADULT - SUBJECTIVE AND OBJECTIVE BOX
INTERVAL HPI/OVERNIGHT EVENTS:  Overnight, Tmax 99, refusing wound vac, keeping ostomy appliance w/ self changes    STATUS POST:      POST OPERATIVE DAY #:     SUBJECTIVE:    MEDICATIONS  (STANDING):  ampicillin/sulbactam  IVPB 1.5 Gram(s) IV Intermittent every 6 hours  heparin   Injectable 5000 Unit(s) SubCutaneous every 8 hours  nicotine -  14 mG/24Hr(s) Patch 1 Patch Transdermal daily    MEDICATIONS  (PRN):  guaiFENesin  milliGRAM(s) Oral every 12 hours PRN Cough  ondansetron Injectable 4 milliGRAM(s) IV Push every 6 hours PRN Nausea      Vital Signs Last 24 Hrs  T(C): 36.6 (18 Dec 2022 00:05), Max: 38.3 (17 Dec 2022 16:44)  T(F): 97.9 (18 Dec 2022 00:05), Max: 100.9 (17 Dec 2022 16:44)  HR: 66 (18 Dec 2022 00:05) (66 - 77)  BP: 112/72 (18 Dec 2022 00:05) (107/73 - 125/78)  BP(mean): --  RR: 18 (18 Dec 2022 00:05) (17 - 18)  SpO2: 95% (18 Dec 2022 00:05) (95% - 97%)    Parameters below as of 18 Dec 2022 00:05  Patient On (Oxygen Delivery Method): room air        PHYSICAL EXAM:  General: Patient is doing well and lying in bed comfortably  Constitutional: alert and oriented   Pulm: Nonlabored breathing, no respiratory distress  CV: Regular rate and rhythm, normal sinus rhythm  Abd:  soft, nontender, nondistended. No rebound, no guarding.   Extremities: warm, well perfused, no edema  Drains: Patient refusing wound vac. Ostomy appliance with no leaks.       I&O's Detail    16 Dec 2022 07:01  -  17 Dec 2022 07:00  --------------------------------------------------------  IN:    Oral Fluid: 550 mL  Total IN: 550 mL    OUT:    Drain (mL): 60 mL    VAC (Vacuum Assisted Closure) System (mL): 100 mL    Voided (mL): 450 mL  Total OUT: 610 mL    Total NET: -60 mL      17 Dec 2022 07:01  -  18 Dec 2022 05:24  --------------------------------------------------------  IN:    Oral Fluid: 930 mL  Total IN: 930 mL    OUT:    Voided (mL): 450 mL  Total OUT: 450 mL    Total NET: 480 mL          LABS:                        16.2   5.78  )-----------( 145      ( 17 Dec 2022 21:26 )             47.7     12-17    135  |  98  |  17  ----------------------------<  101<H>  4.3   |  25  |  1.21    Ca    9.5      17 Dec 2022 21:26  Phos  4.4     12-17  Mg     2.1     12-17            RADIOLOGY & ADDITIONAL STUDIES: INTERVAL HPI/OVERNIGHT EVENTS:  Overnight, Tmax 99, refusing wound vac, keeping ostomy appliance w/ self changes. Patient seen and examined by chief resident and team on AM rounds. Patient without any acute complaints, denies any abdominal pain, -n/-v/-sob/-cp.     SUBJECTIVE:    MEDICATIONS  (STANDING):  ampicillin/sulbactam  IVPB 1.5 Gram(s) IV Intermittent every 6 hours  heparin   Injectable 5000 Unit(s) SubCutaneous every 8 hours  nicotine -  14 mG/24Hr(s) Patch 1 Patch Transdermal daily    MEDICATIONS  (PRN):  guaiFENesin  milliGRAM(s) Oral every 12 hours PRN Cough  ondansetron Injectable 4 milliGRAM(s) IV Push every 6 hours PRN Nausea      Vital Signs Last 24 Hrs  T(C): 36.6 (18 Dec 2022 00:05), Max: 38.3 (17 Dec 2022 16:44)  T(F): 97.9 (18 Dec 2022 00:05), Max: 100.9 (17 Dec 2022 16:44)  HR: 66 (18 Dec 2022 00:05) (66 - 77)  BP: 112/72 (18 Dec 2022 00:05) (107/73 - 125/78)  BP(mean): --  RR: 18 (18 Dec 2022 00:05) (17 - 18)  SpO2: 95% (18 Dec 2022 00:05) (95% - 97%)    Parameters below as of 18 Dec 2022 00:05  Patient On (Oxygen Delivery Method): room air        PHYSICAL EXAM:  General: Patient is doing well and lying in bed comfortably  Constitutional: alert and oriented   Pulm: Nonlabored breathing, no respiratory distress  CV: Regular rate and rhythm, normal sinus rhythm  Abd:  soft, nontender, nondistended. No rebound, no guarding.   Extremities: warm, well perfused, no edema  Drains: Patient refusing wound vac. Ostomy appliance in place with no leaks.       I&O's Detail    16 Dec 2022 07:01  -  17 Dec 2022 07:00  --------------------------------------------------------  IN:    Oral Fluid: 550 mL  Total IN: 550 mL    OUT:    Drain (mL): 60 mL    VAC (Vacuum Assisted Closure) System (mL): 100 mL    Voided (mL): 450 mL  Total OUT: 610 mL    Total NET: -60 mL      17 Dec 2022 07:01  -  18 Dec 2022 05:24  --------------------------------------------------------  IN:    Oral Fluid: 930 mL  Total IN: 930 mL    OUT:    Voided (mL): 450 mL  Total OUT: 450 mL    Total NET: 480 mL          LABS:                        16.2   5.78  )-----------( 145      ( 17 Dec 2022 21:26 )             47.7     12-17    135  |  98  |  17  ----------------------------<  101<H>  4.3   |  25  |  1.21    Ca    9.5      17 Dec 2022 21:26  Phos  4.4     12-17  Mg     2.1     12-17            RADIOLOGY & ADDITIONAL STUDIES:

## 2022-12-19 LAB
ANION GAP SERPL CALC-SCNC: 11 MMOL/L — SIGNIFICANT CHANGE UP (ref 5–17)
BUN SERPL-MCNC: 16 MG/DL — SIGNIFICANT CHANGE UP (ref 7–23)
CALCIUM SERPL-MCNC: 9.5 MG/DL — SIGNIFICANT CHANGE UP (ref 8.4–10.5)
CHLORIDE SERPL-SCNC: 102 MMOL/L — SIGNIFICANT CHANGE UP (ref 96–108)
CO2 SERPL-SCNC: 23 MMOL/L — SIGNIFICANT CHANGE UP (ref 22–31)
CREAT SERPL-MCNC: 0.94 MG/DL — SIGNIFICANT CHANGE UP (ref 0.5–1.3)
EGFR: 99 ML/MIN/1.73M2 — SIGNIFICANT CHANGE UP
GLUCOSE SERPL-MCNC: 108 MG/DL — HIGH (ref 70–99)
HCT VFR BLD CALC: 46.2 % — SIGNIFICANT CHANGE UP (ref 39–50)
HGB BLD-MCNC: 16.3 G/DL — SIGNIFICANT CHANGE UP (ref 13–17)
MAGNESIUM SERPL-MCNC: 2 MG/DL — SIGNIFICANT CHANGE UP (ref 1.6–2.6)
MCHC RBC-ENTMCNC: 33.6 PG — SIGNIFICANT CHANGE UP (ref 27–34)
MCHC RBC-ENTMCNC: 35.3 GM/DL — SIGNIFICANT CHANGE UP (ref 32–36)
MCV RBC AUTO: 95.3 FL — SIGNIFICANT CHANGE UP (ref 80–100)
NRBC # BLD: 0 /100 WBCS — SIGNIFICANT CHANGE UP (ref 0–0)
PHOSPHATE SERPL-MCNC: 3.8 MG/DL — SIGNIFICANT CHANGE UP (ref 2.5–4.5)
PLATELET # BLD AUTO: 129 K/UL — LOW (ref 150–400)
POTASSIUM SERPL-MCNC: 4 MMOL/L — SIGNIFICANT CHANGE UP (ref 3.5–5.3)
POTASSIUM SERPL-SCNC: 4 MMOL/L — SIGNIFICANT CHANGE UP (ref 3.5–5.3)
RBC # BLD: 4.85 M/UL — SIGNIFICANT CHANGE UP (ref 4.2–5.8)
RBC # FLD: 12.5 % — SIGNIFICANT CHANGE UP (ref 10.3–14.5)
SODIUM SERPL-SCNC: 136 MMOL/L — SIGNIFICANT CHANGE UP (ref 135–145)
WBC # BLD: 5.11 K/UL — SIGNIFICANT CHANGE UP (ref 3.8–10.5)
WBC # FLD AUTO: 5.11 K/UL — SIGNIFICANT CHANGE UP (ref 3.8–10.5)

## 2022-12-19 RX ADMIN — AMPICILLIN SODIUM AND SULBACTAM SODIUM 100 GRAM(S): 250; 125 INJECTION, POWDER, FOR SUSPENSION INTRAMUSCULAR; INTRAVENOUS at 07:52

## 2022-12-19 RX ADMIN — AMPICILLIN SODIUM AND SULBACTAM SODIUM 100 GRAM(S): 250; 125 INJECTION, POWDER, FOR SUSPENSION INTRAMUSCULAR; INTRAVENOUS at 17:13

## 2022-12-19 RX ADMIN — AMPICILLIN SODIUM AND SULBACTAM SODIUM 100 GRAM(S): 250; 125 INJECTION, POWDER, FOR SUSPENSION INTRAMUSCULAR; INTRAVENOUS at 11:40

## 2022-12-19 RX ADMIN — AMPICILLIN SODIUM AND SULBACTAM SODIUM 100 GRAM(S): 250; 125 INJECTION, POWDER, FOR SUSPENSION INTRAMUSCULAR; INTRAVENOUS at 00:32

## 2022-12-19 NOTE — PROGRESS NOTE ADULT - ASSESSMENT
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 Costa for persistence drainage from abdominal wound. Pt afebrile, HDS. Abdomen with large midline wound with exposed erythematous subcutaneous tissue with drainage of yellow serous fluid, circumferential erythema/cellulitis around wound. Labs significant for WBC 10.99, Hb 16.5, unremarkable chemistry panel, and ALT 50. CTAP with PO/IV contrast demonstrates large midline abdominal wall muscular defect, likely from recent open abdominal surgery. Small bowel containing left anterolateral spigelian type hernia without evidence of strangulation.    Plan:  LRD  Pain/nausea control prn  c/w Unasyn  Home meds as appropriate  HSQ/SCDs/OOB/IS  AM labs

## 2022-12-19 NOTE — PROGRESS NOTE ADULT - SUBJECTIVE AND OBJECTIVE BOX
SUBJECTIVE:   Patient seen and examined on am rounds. No new complaints. -n-v-cp-sob.    Vital Signs Last 24 Hrs  T(C): 36.7 (19 Dec 2022 05:12), Max: 37.4 (19 Dec 2022 00:35)  T(F): 98 (19 Dec 2022 05:12), Max: 99.4 (19 Dec 2022 00:35)  HR: 57 (19 Dec 2022 05:12) (52 - 63)  BP: 101/69 (19 Dec 2022 05:12) (94/59 - 114/76)  BP(mean): --  RR: 16 (19 Dec 2022 05:12) (16 - 18)  SpO2: 96% (19 Dec 2022 05:12) (95% - 98%)    Parameters below as of 19 Dec 2022 05:12  Patient On (Oxygen Delivery Method): room air        I&O's Summary    18 Dec 2022 07:01  -  19 Dec 2022 07:00  --------------------------------------------------------  IN: 560 mL / OUT: 0 mL / NET: 560 mL        Physical Exam:  General Appearance: Appears well, NAD  Pulmonary: Nonlabored breathing, no respiratory distress  Cardiovascular: NSR  Abdomen: Soft, nondisteded, nontender. Persistent leakage from midline wound.  Extremities: [facility]    LABS:                        16.3   5.33  )-----------( 140      ( 18 Dec 2022 05:30 )             47.1     12-18    137  |  102  |  16  ----------------------------<  102<H>  4.2   |  22  |  1.05    Ca    9.6      18 Dec 2022 05:30  Phos  3.8     12-18  Mg     2.1     12-18

## 2022-12-19 NOTE — DIETITIAN INITIAL EVALUATION ADULT - PERTINENT MEDS FT
MEDICATIONS  (STANDING):  ampicillin/sulbactam  IVPB 1.5 Gram(s) IV Intermittent every 6 hours  heparin   Injectable 5000 Unit(s) SubCutaneous every 8 hours  nicotine -  14 mG/24Hr(s) Patch 1 Patch Transdermal daily    MEDICATIONS  (PRN):  guaiFENesin  milliGRAM(s) Oral every 12 hours PRN Cough  ondansetron Injectable 4 milliGRAM(s) IV Push every 6 hours PRN Nausea

## 2022-12-19 NOTE — DIETITIAN INITIAL EVALUATION ADULT - PERTINENT LABORATORY DATA
12-19    136  |  102  |  16  ----------------------------<  108<H>  4.0   |  23  |  0.94    Ca    9.5      19 Dec 2022 05:30  Phos  3.8     12-19  Mg     2.0     12-19

## 2022-12-19 NOTE — DIETITIAN INITIAL EVALUATION ADULT - OTHER INFO
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 Costa for persistence drainage from abdominal wound. Pt afebrile, HDS. Abdomen with large midline wound with exposed erythematous subcutaneous tissue with drainage of yellow serous fluid, circumferential erythema/cellulitis around wound. Labs significant for WBC 10.99, Hb 16.5, unremarkable chemistry panel, and ALT 50. CTAP with PO/IV contrast demonstrates large midline abdominal wall muscular defect, likely from recent open abdominal surgery. Small bowel containing left anterolateral spigelian type hernia without evidence of strangulation.    Pt seen resting in bed. He denies changes in wt, reports UBW of 206 lbs, CBW of 205 lbs, consistent. Reports no change in appetite PTA and currently. On low fiber diet, states good PO, finishing meals. No cultural, ethnic, Restoration food preferences noted. NFKA. Continue to encourage adequate PO and protein intake as able. He reports would rather be off low fiber diet, but also reported having solid bits of food coming out via midline fistula drainage. Discussed likely need for low fiber diet given current clinical presentation. Pending ability to change diet. Denies n/v/c. Noted ostomy bag with liquid fecal output. Skin: Brad 20, no PU or edema. RD to follow. See below for nutr recs.

## 2022-12-19 NOTE — DIETITIAN INITIAL EVALUATION ADULT - OTHER CALCULATIONS
lbs. %%. IBW used to calculate energy needs due to pt's current body weight exceeding 120% of IBW. Needs adjusted for age and wt, erythema/cellulitis around wound, fistula

## 2022-12-19 NOTE — DIETITIAN INITIAL EVALUATION ADULT - ADD RECOMMEND
1. Continue with current diet  >> honor pt food preferences as able  2. BM and pain regimen per team  3. Monitor BMP, BG, POCT, renal indices, LFTs, CBC, lytes, replete prn  4. Diet edu prn

## 2022-12-20 VITALS
OXYGEN SATURATION: 94 % | HEART RATE: 62 BPM | TEMPERATURE: 98 F | DIASTOLIC BLOOD PRESSURE: 79 MMHG | SYSTOLIC BLOOD PRESSURE: 118 MMHG | RESPIRATION RATE: 17 BRPM

## 2022-12-20 LAB
ANION GAP SERPL CALC-SCNC: 10 MMOL/L — SIGNIFICANT CHANGE UP (ref 5–17)
BUN SERPL-MCNC: 15 MG/DL — SIGNIFICANT CHANGE UP (ref 7–23)
CALCIUM SERPL-MCNC: 9.2 MG/DL — SIGNIFICANT CHANGE UP (ref 8.4–10.5)
CHLORIDE SERPL-SCNC: 104 MMOL/L — SIGNIFICANT CHANGE UP (ref 96–108)
CO2 SERPL-SCNC: 27 MMOL/L — SIGNIFICANT CHANGE UP (ref 22–31)
CREAT SERPL-MCNC: 1.08 MG/DL — SIGNIFICANT CHANGE UP (ref 0.5–1.3)
EGFR: 84 ML/MIN/1.73M2 — SIGNIFICANT CHANGE UP
GLUCOSE SERPL-MCNC: 103 MG/DL — HIGH (ref 70–99)
HCT VFR BLD CALC: 48.3 % — SIGNIFICANT CHANGE UP (ref 39–50)
HGB BLD-MCNC: 16.2 G/DL — SIGNIFICANT CHANGE UP (ref 13–17)
MAGNESIUM SERPL-MCNC: 2.1 MG/DL — SIGNIFICANT CHANGE UP (ref 1.6–2.6)
MCHC RBC-ENTMCNC: 32.9 PG — SIGNIFICANT CHANGE UP (ref 27–34)
MCHC RBC-ENTMCNC: 33.5 GM/DL — SIGNIFICANT CHANGE UP (ref 32–36)
MCV RBC AUTO: 98 FL — SIGNIFICANT CHANGE UP (ref 80–100)
NRBC # BLD: 0 /100 WBCS — SIGNIFICANT CHANGE UP (ref 0–0)
PHOSPHATE SERPL-MCNC: 2.9 MG/DL — SIGNIFICANT CHANGE UP (ref 2.5–4.5)
PLATELET # BLD AUTO: 144 K/UL — LOW (ref 150–400)
POTASSIUM SERPL-MCNC: 4.4 MMOL/L — SIGNIFICANT CHANGE UP (ref 3.5–5.3)
POTASSIUM SERPL-SCNC: 4.4 MMOL/L — SIGNIFICANT CHANGE UP (ref 3.5–5.3)
RBC # BLD: 4.93 M/UL — SIGNIFICANT CHANGE UP (ref 4.2–5.8)
RBC # FLD: 12.3 % — SIGNIFICANT CHANGE UP (ref 10.3–14.5)
SODIUM SERPL-SCNC: 141 MMOL/L — SIGNIFICANT CHANGE UP (ref 135–145)
WBC # BLD: 6.76 K/UL — SIGNIFICANT CHANGE UP (ref 3.8–10.5)
WBC # FLD AUTO: 6.76 K/UL — SIGNIFICANT CHANGE UP (ref 3.8–10.5)

## 2022-12-20 PROCEDURE — 71045 X-RAY EXAM CHEST 1 VIEW: CPT

## 2022-12-20 PROCEDURE — 80053 COMPREHEN METABOLIC PANEL: CPT

## 2022-12-20 PROCEDURE — 86901 BLOOD TYPING SEROLOGIC RH(D): CPT

## 2022-12-20 PROCEDURE — 85025 COMPLETE CBC W/AUTO DIFF WBC: CPT

## 2022-12-20 PROCEDURE — U0003: CPT

## 2022-12-20 PROCEDURE — 80048 BASIC METABOLIC PNL TOTAL CA: CPT

## 2022-12-20 PROCEDURE — 83735 ASSAY OF MAGNESIUM: CPT

## 2022-12-20 PROCEDURE — 85730 THROMBOPLASTIN TIME PARTIAL: CPT

## 2022-12-20 PROCEDURE — 86900 BLOOD TYPING SEROLOGIC ABO: CPT

## 2022-12-20 PROCEDURE — 85027 COMPLETE CBC AUTOMATED: CPT

## 2022-12-20 PROCEDURE — 76000 FLUOROSCOPY <1 HR PHYS/QHP: CPT

## 2022-12-20 PROCEDURE — 86850 RBC ANTIBODY SCREEN: CPT

## 2022-12-20 PROCEDURE — 74177 CT ABD & PELVIS W/CONTRAST: CPT | Mod: MA

## 2022-12-20 PROCEDURE — 84100 ASSAY OF PHOSPHORUS: CPT

## 2022-12-20 PROCEDURE — 99285 EMERGENCY DEPT VISIT HI MDM: CPT | Mod: 25

## 2022-12-20 PROCEDURE — 85610 PROTHROMBIN TIME: CPT

## 2022-12-20 PROCEDURE — 76000 FLUOROSCOPY <1 HR PHYS/QHP: CPT | Mod: 26

## 2022-12-20 PROCEDURE — 36415 COLL VENOUS BLD VENIPUNCTURE: CPT

## 2022-12-20 PROCEDURE — U0005: CPT

## 2022-12-20 RX ADMIN — AMPICILLIN SODIUM AND SULBACTAM SODIUM 100 GRAM(S): 250; 125 INJECTION, POWDER, FOR SUSPENSION INTRAMUSCULAR; INTRAVENOUS at 12:09

## 2022-12-20 RX ADMIN — AMPICILLIN SODIUM AND SULBACTAM SODIUM 100 GRAM(S): 250; 125 INJECTION, POWDER, FOR SUSPENSION INTRAMUSCULAR; INTRAVENOUS at 06:05

## 2022-12-20 RX ADMIN — AMPICILLIN SODIUM AND SULBACTAM SODIUM 100 GRAM(S): 250; 125 INJECTION, POWDER, FOR SUSPENSION INTRAMUSCULAR; INTRAVENOUS at 00:03

## 2022-12-20 NOTE — DISCHARGE NOTE NURSING/CASE MANAGEMENT/SOCIAL WORK - NSDCPEFALRISK_GEN_ALL_CORE
For information on Fall & Injury Prevention, visit: [email or web address] OR  [email or web address] OR  https://www.cdc.gov/steadi/patient.html

## 2022-12-20 NOTE — PROGRESS NOTE ADULT - REASON FOR ADMISSION
Abdominal wound

## 2022-12-20 NOTE — PROGRESS NOTE ADULT - ASSESSMENT
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 Costa for persistence drainage from abdominal wound. Pt afebrile, HDS. Abdomen with large midline wound with exposed erythematous subcutaneous tissue with drainage of yellow serous fluid, circumferential erythema/cellulitis around wound. Labs significant for WBC 10.99, Hb 16.5, unremarkable chemistry panel, and ALT 50. CTAP with PO/IV contrast demonstrates large midline abdominal wall muscular defect, likely from recent open abdominal surgery. Small bowel containing left anterolateral spigelian type hernia without evidence of strangulation.    Plan:  LFD  Pain/nausea control prn  Unasyn  Home meds as appropriate  HSQ/SCDs/OOB/IS  AM labs  fistulogram scan today  DC home after scan with VNS

## 2022-12-20 NOTE — PROGRESS NOTE ADULT - SUBJECTIVE AND OBJECTIVE BOX
INTERVAL HPI/OVERNIGHT EVENTS: refusing SQH, no leak    SUBJECTIVE:  pt seen at bedside, feeling better. wound manager without leak overnight. states it was not burning his skin and was able to sleep comfortably    MEDICATIONS  (STANDING):  ampicillin/sulbactam  IVPB 1.5 Gram(s) IV Intermittent every 6 hours  heparin   Injectable 5000 Unit(s) SubCutaneous every 8 hours  nicotine -  14 mG/24Hr(s) Patch 1 Patch Transdermal daily    MEDICATIONS  (PRN):  guaiFENesin  milliGRAM(s) Oral every 12 hours PRN Cough  ondansetron Injectable 4 milliGRAM(s) IV Push every 6 hours PRN Nausea      Vital Signs Last 24 Hrs  T(C): 36.6 (20 Dec 2022 05:31), Max: 36.8 (19 Dec 2022 16:06)  T(F): 97.9 (20 Dec 2022 05:31), Max: 98.2 (19 Dec 2022 16:06)  HR: 55 (20 Dec 2022 05:31) (55 - 61)  BP: 115/76 (20 Dec 2022 05:31) (109/69 - 129/78)  BP(mean): 61 (19 Dec 2022 16:06) (61 - 61)  RR: 18 (20 Dec 2022 05:31) (17 - 18)  SpO2: 95% (20 Dec 2022 05:31) (95% - 97%)    Parameters below as of 20 Dec 2022 05:31  Patient On (Oxygen Delivery Method): room air        PHYSICAL EXAM:      Constitutional: A&Ox3    Respiratory: non labored breathing, no respiratory distress    Cardiovascular: NSR, RRR    Gastrointestinal: soft, nontender, nondistended, wound manager in place without leak, EC fistula with output    Extremities: (-) edema                  I&O's Detail    18 Dec 2022 07:01  -  19 Dec 2022 07:00  --------------------------------------------------------  IN:    IV PiggyBack: 200 mL    Oral Fluid: 360 mL  Total IN: 560 mL    OUT:  Total OUT: 0 mL    Total NET: 560 mL      19 Dec 2022 07:01  -  20 Dec 2022 06:57  --------------------------------------------------------  IN:    IV PiggyBack: 100 mL    Oral Fluid: 240 mL  Total IN: 340 mL    OUT:    Drain (mL): 150 mL    Voided (mL): 300 mL  Total OUT: 450 mL    Total NET: -110 mL          LABS:                        16.3   5.11  )-----------( 129      ( 19 Dec 2022 05:30 )             46.2     12-19    136  |  102  |  16  ----------------------------<  108<H>  4.0   |  23  |  0.94    Ca    9.5      19 Dec 2022 05:30  Phos  3.8     12-19  Mg     2.0     12-19            RADIOLOGY & ADDITIONAL STUDIES:

## 2022-12-20 NOTE — DISCHARGE NOTE NURSING/CASE MANAGEMENT/SOCIAL WORK - PATIENT PORTAL LINK FT
You can access the FollowMyHealth Patient Portal offered by [facility] by registering at the following website: [email or web address]. By joining [employer]’s FollowMyHealth portal, you will also be able to view your health information using other applications (apps) compatible with our system.

## 2023-06-17 NOTE — DISCHARGE NOTE PROVIDER - DISCHARGE DATE
16-Dec-2022
